# Patient Record
Sex: FEMALE | Race: WHITE | NOT HISPANIC OR LATINO | Employment: OTHER | URBAN - METROPOLITAN AREA
[De-identification: names, ages, dates, MRNs, and addresses within clinical notes are randomized per-mention and may not be internally consistent; named-entity substitution may affect disease eponyms.]

---

## 2021-10-08 ENCOUNTER — NEW PATIENT COMPREHENSIVE (OUTPATIENT)
Dept: URBAN - METROPOLITAN AREA CLINIC 40 | Facility: CLINIC | Age: 76
End: 2021-10-08

## 2021-10-08 DIAGNOSIS — H04.123: ICD-10-CM

## 2021-10-08 DIAGNOSIS — H43.813: ICD-10-CM

## 2021-10-08 DIAGNOSIS — H52.02: ICD-10-CM

## 2021-10-08 DIAGNOSIS — H52.11: ICD-10-CM

## 2021-10-08 DIAGNOSIS — H25.813: ICD-10-CM

## 2021-10-08 DIAGNOSIS — H52.4: ICD-10-CM

## 2021-10-08 PROCEDURE — 92015 DETERMINE REFRACTIVE STATE: CPT

## 2021-10-08 PROCEDURE — 92004 COMPRE OPH EXAM NEW PT 1/>: CPT

## 2021-10-08 ASSESSMENT — TONOMETRY
OS_IOP_MMHG: 20
OD_IOP_MMHG: 20

## 2021-10-08 ASSESSMENT — VISUAL ACUITY
OD_PH: 20/25-2
OD_SC: 20/30+2
OS_PH: 20/30-1
OS_SC: 20/40-2

## 2022-09-02 NOTE — PATIENT DISCUSSION
9/2/2022:  at sup disc has intraret edema today (shows up on OCT).   had recent trials with COVID and had a cough and was hospitalized.  see TB for eval of retinal edema.

## 2025-03-04 ENCOUNTER — NEW PATIENT (OUTPATIENT)
Age: 80
End: 2025-03-04

## 2025-03-04 DIAGNOSIS — H25.813: ICD-10-CM

## 2025-03-04 DIAGNOSIS — H43.813: ICD-10-CM

## 2025-03-04 DIAGNOSIS — H04.123: ICD-10-CM

## 2025-03-04 PROCEDURE — 92015 DETERMINE REFRACTIVE STATE: CPT

## 2025-03-04 PROCEDURE — 92004 COMPRE OPH EXAM NEW PT 1/>: CPT

## 2025-04-08 ENCOUNTER — PREPPED CHART (OUTPATIENT)
Age: 80
End: 2025-04-08

## 2025-08-05 NOTE — PATIENT DISCUSSION
[facility] - Periop Services  Discharge Note  Short Stay    Procedure(s) (LRB):  INJECTION, SPINE, LUMBOSACRAL, TRANSFORAMINAL APPROACH l3-4 and l4-5 (Right)      OUTCOME: Patient tolerated treatment/procedure well without complication and is now ready for discharge.    DISPOSITION: Home or Self Care    FINAL DIAGNOSIS:  <principal problem not specified>    FOLLOWUP: In clinic    DISCHARGE INSTRUCTIONS:    Discharge Procedure Orders   Notify your health care provider if you experience any of the following:  temperature >100.4     Notify your health care provider if you experience any of the following:  severe uncontrolled pain     Notify your health care provider if you experience any of the following:  redness, tenderness, or signs of infection (pain, swelling, redness, odor or green/yellow discharge around incision site)     Activity as tolerated        TIME SPENT ON DISCHARGE: 30 minutes   Discussed AREDS supplements, BP Control, UV protection and dark leafy green vegetables.